# Patient Record
Sex: MALE | Race: WHITE
[De-identification: names, ages, dates, MRNs, and addresses within clinical notes are randomized per-mention and may not be internally consistent; named-entity substitution may affect disease eponyms.]

---

## 2021-06-25 ENCOUNTER — HOSPITAL ENCOUNTER (OUTPATIENT)
Dept: HOSPITAL 84 - D.OPS | Age: 51
Discharge: HOME | End: 2021-06-25
Attending: ORTHOPAEDIC SURGERY
Payer: COMMERCIAL

## 2021-06-25 VITALS
BODY MASS INDEX: 27.83 KG/M2 | BODY MASS INDEX: 27.83 KG/M2 | WEIGHT: 210 LBS | HEIGHT: 73 IN | WEIGHT: 210 LBS | HEIGHT: 73 IN

## 2021-06-25 VITALS — DIASTOLIC BLOOD PRESSURE: 79 MMHG | SYSTOLIC BLOOD PRESSURE: 127 MMHG

## 2021-06-25 DIAGNOSIS — G56.01: Primary | ICD-10-CM

## 2021-06-25 DIAGNOSIS — S56.211A: ICD-10-CM

## 2021-06-25 NOTE — NUR
RECEIVED AWAKE AND ALERT AFTE TIVA. EFFECTIVE WRIST PERIPHERL NERVE
BLOCK WITHOUT C/O PAIN. DISCHARGE INSTRUCTIONS GIVEN AND PT
VERBALIZED AN UNDERSTANDING. IV D/C'D WITH CANNULA INTACT, PRESSURE
HELD AND DRSG PLACED.

## 2021-06-27 NOTE — OP
PATIENT NAME:  KAI LAZAR                           MEDICAL RECORD: [id number]
:74                                             LOCATION:[facility]          
                                                         ADMISSION DATE:        
SURGEON:  ARNOL SMITH DO         
 
 
DATE OF OPERATION:  2021
 
PROCEDURES PERFORMED:  Right endoscopic carpal tunnel release and right flexor
carpi radialis tendon debridement.
 
PREOPERATIVE DIAGNOSES:
1.  Right carpal tunnel syndrome.
2.  Right flexor carpi radialis tendinitis.
 
POSTOPERATIVE DIAGNOSES:
1.  Right carpal tunnel syndrome.
2.  Right flexor carpi radialis tendinitis.
 
INDICATIONS:  Mr. Lazar is a 50-year-old male who has had right wrist pain
for quite some time.  He injured it exercising.  He had a nerve conduction study
showing carpal tunnel syndrome with a distal motor latency I believe 4.5, which
is just at the threshold of 4.3 just over it.  He also had pain in the flexor
carpi radialis tendon.  It was quite swollen and tender and had pain with
extension of the wrist and on doing any kind of wrist motion.  I informed him of
the risks of this including infection, bleeding, damage to the median nerve,
continued pain, need for further surgery, rupture of the flexor carpi radialis
tendon and need for repair also and loss of use of the hand and he signed the
consent.
 
SURGEON:  Arnol Smith DO.
 
DESCRIPTION OF PROCEDURE:  The patient received a local block by Anesthesia in
preoperative area.  He was then taken to the operative suite, given light
sedation, 2 grams of Ancef preoperatively.  The right upper extremity was then
prepped and draped in sterile fashion.  Timeout was performed.  Everyone was in
agreement with correct side, site, patient, and procedure.  I then began by
making an incision centered over the palmaris longus tendon cheating it slightly
ulnarly due to the incision that was going to be over the FCR and then made a
blunt dissection down with two Ragnells to the median nerve, released the fascia
over the median nerve from distal to proximal in the incision and then went to
the carpal tunnel itself with the dilators and then brought in a sheath.  Once I
got a good view of the transverse carpal ligament, I assured there was no
transligamentous nerve.  Using a rasp and a probe, I brought in the blade and
raised it up and transected the transverse carpal ligament fat and herniated it
down into in the carpal tunnel indicating good release.  I then used the long
end of Ragnell and scissors and ensured there were no remaining fibers of the
transverse carpal ligament and there were not.  I then made a longitudinal
incision with flexor carpi radialis tendon and made careful dissection down to
the tendon sheath, opened it up.  There was a gush of fluid that came out of it
and then saw that the tendon was partially torn.  I then debrided approximately
10% of the tendon and debrided it and removed it.  I then irrigated and let the
tourniquet down as the tourniquet was inflated prior to starting and
exsanguinated the right upper extremity to 250 mmHg.  It was up for 11 minutes. 
I then coagulated any bleeding with a bipolar.  I then closed each wound with
5-0 Monocryl in inverted interrupted fashion and placed Steri-Strips, Adaptic, 4
x 4, Kerlix and Coban lightly wrapped on the wrist.  He was then awakened and
taken to recovery in stable condition.
 
 
 
OPERATIVE REPORT                               [id number]    KAI LAZAR         
 
 
 
BLOOD LOSS:  Minimal.
 
COMPLICATIONS:  None.
 
TRANSINT:[id number] Voice Confirmation ID: [id number] DOCUMENT ID: [id number]
                                           
                                           ARNOL SMITH DO         
 
 
 
Electronically Signed by ARNOL GLORIA on 21 at 0727
 
 
 
 
 
 
 
 
 
 
 
 
 
 
 
 
 
 
 
 
 
 
 
 
 
 
 
 
 
 
 
 
 
 
 
CC:                                                             [id number]
DICTATION DATE: 21     :     21 0053      [facility] 
                                                                      21
[facility]                                          
[address] [place] [address]